# Patient Record
Sex: FEMALE | Race: BLACK OR AFRICAN AMERICAN | NOT HISPANIC OR LATINO | URBAN - NONMETROPOLITAN AREA
[De-identification: names, ages, dates, MRNs, and addresses within clinical notes are randomized per-mention and may not be internally consistent; named-entity substitution may affect disease eponyms.]

---

## 2021-11-15 PROBLEM — G43.709 CHRONIC MIGRAINE WITHOUT AURA WITHOUT STATUS MIGRAINOSUS, NOT INTRACTABLE: Chronic | Status: CHRONIC | Noted: 2021-11-15

## 2021-11-15 PROBLEM — R51.9 CHRONIC DAILY HEADACHE: Chronic | Status: CHRONIC | Noted: 2021-11-15

## 2021-11-15 PROBLEM — M54.50 CHRONIC LOW BACK PAIN WITHOUT SCIATICA: Chronic | Status: CHRONIC | Noted: 2021-11-15

## 2023-01-17 PROBLEM — M25.50 ARTHRALGIA: Chronic | Status: CHRONIC | Noted: 2023-01-17

## 2023-01-17 PROBLEM — R41.3 MEMORY LOSS: Chronic | Status: CHRONIC | Noted: 2023-01-17

## 2023-01-17 PROBLEM — R41.0 DISORIENTATED: Chronic | Status: CHRONIC | Noted: 2023-01-17

## 2023-01-17 PROBLEM — M25.562 CHRONIC PAIN OF LEFT KNEE: Chronic | Status: CHRONIC | Noted: 2023-01-17

## 2023-04-17 ENCOUNTER — ON CAMPUS - OUTPATIENT (OUTPATIENT)
Dept: URBAN - NONMETROPOLITAN AREA HOSPITAL 34 | Facility: HOSPITAL | Age: 48
End: 2023-04-17
Payer: COMMERCIAL

## 2023-04-17 DIAGNOSIS — K83.8 OTHER SPECIFIED DISEASES OF BILIARY TRACT: ICD-10-CM

## 2023-04-17 DIAGNOSIS — K29.50 UNSPECIFIED CHRONIC GASTRITIS WITHOUT BLEEDING: ICD-10-CM

## 2023-04-17 PROCEDURE — 43262 ENDO CHOLANGIOPANCREATOGRAPH: CPT | Performed by: INTERNAL MEDICINE

## 2023-04-17 PROCEDURE — 43239 EGD BIOPSY SINGLE/MULTIPLE: CPT | Mod: 51 | Performed by: INTERNAL MEDICINE

## 2023-05-11 ENCOUNTER — OFFICE (OUTPATIENT)
Dept: URBAN - NONMETROPOLITAN AREA CLINIC 1 | Facility: CLINIC | Age: 48
End: 2023-05-11
Payer: COMMERCIAL

## 2023-05-11 VITALS
HEART RATE: 90 BPM | DIASTOLIC BLOOD PRESSURE: 64 MMHG | WEIGHT: 239 LBS | SYSTOLIC BLOOD PRESSURE: 94 MMHG | HEIGHT: 66 IN

## 2023-05-11 DIAGNOSIS — E66.9 OBESITY, UNSPECIFIED: ICD-10-CM

## 2023-05-11 DIAGNOSIS — M51.36 OTHER INTERVERTEBRAL DISC DEGENERATION, LUMBAR REGION: ICD-10-CM

## 2023-05-11 DIAGNOSIS — R11.0 NAUSEA: ICD-10-CM

## 2023-05-11 DIAGNOSIS — F41.9 ANXIETY DISORDER, UNSPECIFIED: ICD-10-CM

## 2023-05-11 DIAGNOSIS — K21.9 GASTRO-ESOPHAGEAL REFLUX DISEASE WITHOUT ESOPHAGITIS: ICD-10-CM

## 2023-05-11 DIAGNOSIS — I10 ESSENTIAL (PRIMARY) HYPERTENSION: ICD-10-CM

## 2023-05-11 PROCEDURE — 99213 OFFICE O/P EST LOW 20 MIN: CPT | Performed by: NURSE PRACTITIONER

## 2023-05-11 NOTE — SERVICEHPINOTES
She was hospitalized in April. The GI service was consulted for recurrent nausea vomiting secondary to cyclic vomiting syndrome. She is had an extensive GI workup including EGD and gastric emptying study which was generally unremarkable. MRCP did not show any evidence of choledocholithiasis. However when she is having these pains she does increase her liver enzymes. Possible sphincter of Oddi dysfunction.br
br   ERCP 4/17/23, Dr. Arreola-
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 The patient underwent a biliary sphincterotomy with balloon sweep. There was no stones in the bile duct. The patient had a mildly dilated post sphincterotomy biliary tree. There was some mild post sphincterotomy bleeding which resolved with balloon tamponade. The patient was found to have retained food in the stomach. She is had problems with recurrent nausea vomiting. She also has a history Helicobacter pylori. Will follow up the biopsy results. emptying study a few months ago which was normal and again while in the hospital. She does smoke marijuana so she also may have cyclic vomiting syndrome
br
br Her chronic illnesses include abdominal pain, recurrent nausea vomiting most likely secondary to cyclic vomiting syndrome from marijuana use, Chronic GERD with gastritis, previous Helicobacter pylori with negative gastric biopsies and negative MAYNOR test August 2022 EGD, Generalized anxiety disorder with probable depression,   History of marijuana use,  Elevated liver enzymes thought secondary to fatty liver, Gallstones status post cholecystectomy 2006. Chronic back pain, degenerative disc disease, HTN, obesity, vertigo, history of leaky heart valve, migraine headaches, arthritis.
br
br Today she feels better.  No abdominal pain, but still has some nausea. Has tried to cut back on marijuana use. She is using Carafate and recently started using [place] Honey.  Gastric emptying scan during hospital stay was normal.